# Patient Record
Sex: FEMALE | Race: BLACK OR AFRICAN AMERICAN | NOT HISPANIC OR LATINO | Employment: OTHER | URBAN - METROPOLITAN AREA
[De-identification: names, ages, dates, MRNs, and addresses within clinical notes are randomized per-mention and may not be internally consistent; named-entity substitution may affect disease eponyms.]

---

## 2022-12-13 ENCOUNTER — OFFICE VISIT (OUTPATIENT)
Dept: PODIATRY | Facility: CLINIC | Age: 82
End: 2022-12-13
Payer: COMMERCIAL

## 2022-12-13 VITALS — HEART RATE: 85 BPM | DIASTOLIC BLOOD PRESSURE: 86 MMHG | SYSTOLIC BLOOD PRESSURE: 176 MMHG

## 2022-12-13 DIAGNOSIS — I73.9 ASYMPTOMATIC PVD (PERIPHERAL VASCULAR DISEASE): Primary | ICD-10-CM

## 2022-12-13 DIAGNOSIS — B35.1 ONYCHOMYCOSIS DUE TO DERMATOPHYTE: ICD-10-CM

## 2022-12-13 DIAGNOSIS — G60.9 IDIOPATHIC PERIPHERAL NEUROPATHY: ICD-10-CM

## 2022-12-13 PROCEDURE — 99203 OFFICE O/P NEW LOW 30 MIN: CPT | Mod: 25,S$GLB,, | Performed by: PODIATRIST

## 2022-12-13 PROCEDURE — 99203 PR OFFICE/OUTPT VISIT, NEW, LEVL III, 30-44 MIN: ICD-10-PCS | Mod: 25,S$GLB,, | Performed by: PODIATRIST

## 2022-12-13 PROCEDURE — 11721 DEBRIDE NAIL 6 OR MORE: CPT | Mod: Q9,S$GLB,, | Performed by: PODIATRIST

## 2022-12-13 PROCEDURE — 11721 ROUTINE FOOT CARE: ICD-10-PCS | Mod: Q9,S$GLB,, | Performed by: PODIATRIST

## 2022-12-13 RX ORDER — OMEPRAZOLE 20 MG/1
20 CAPSULE, DELAYED RELEASE ORAL
COMMUNITY
Start: 2022-12-11

## 2022-12-13 RX ORDER — DULOXETIN HYDROCHLORIDE 30 MG/1
30 CAPSULE, DELAYED RELEASE ORAL
COMMUNITY
Start: 2022-11-16

## 2022-12-13 RX ORDER — HYALURONATE SODIUM 30 MG/2 ML
30 SYRINGE (ML) INTRAARTICULAR
COMMUNITY
Start: 2022-11-30 | End: 2023-07-11

## 2022-12-13 RX ORDER — LOSARTAN POTASSIUM 25 MG/1
25 TABLET ORAL
COMMUNITY
Start: 2022-08-03 | End: 2023-07-11

## 2022-12-13 RX ORDER — ROPINIROLE 0.25 MG/1
0.25 TABLET, FILM COATED ORAL NIGHTLY
COMMUNITY
Start: 2022-12-07 | End: 2023-07-11

## 2022-12-13 RX ORDER — FUROSEMIDE 40 MG/1
40 TABLET ORAL
COMMUNITY
Start: 2022-10-26

## 2022-12-13 RX ORDER — AMLODIPINE BESYLATE 10 MG/1
10 TABLET ORAL
COMMUNITY
Start: 2022-07-01 | End: 2023-07-11

## 2022-12-13 RX ORDER — LOSARTAN POTASSIUM 50 MG/1
50 TABLET ORAL
COMMUNITY
Start: 2022-10-26

## 2022-12-13 RX ORDER — POTASSIUM CHLORIDE 20 MEQ/1
20 TABLET, EXTENDED RELEASE ORAL 2 TIMES DAILY
COMMUNITY
Start: 2022-12-10

## 2022-12-13 RX ORDER — LOPERAMIDE HYDROCHLORIDE 2 MG/1
2 CAPSULE ORAL
COMMUNITY
Start: 2022-12-07 | End: 2023-07-11

## 2022-12-13 RX ORDER — TRAMADOL HYDROCHLORIDE 50 MG/1
50 TABLET ORAL 4 TIMES DAILY PRN
COMMUNITY
Start: 2022-12-07

## 2022-12-13 RX ORDER — CYCLOBENZAPRINE HCL 10 MG
10 TABLET ORAL 2 TIMES DAILY PRN
COMMUNITY
Start: 2022-11-22 | End: 2023-07-11

## 2022-12-13 RX ORDER — LATANOPROST 50 UG/ML
1 SOLUTION/ DROPS OPHTHALMIC NIGHTLY
COMMUNITY
Start: 2022-09-10 | End: 2023-07-11

## 2022-12-13 RX ORDER — ERGOCALCIFEROL 1.25 MG/1
50000 CAPSULE ORAL
COMMUNITY
Start: 2022-09-26 | End: 2023-07-11

## 2022-12-14 NOTE — PROGRESS NOTES
Subjective:      Patient ID: Valery Palmer is a 82 y.o. female.    Chief Complaint: Nail Care    Valery is a 82 y.o. female who presents to the clinic for evaluation and treatment of high risk feet. Valery has a past medical history of Cancer and Hypertension. The patient's chief complaint is long, thick toenails. This patient has documented high risk feet requiring routine maintenance secondary to peripheral vascular disease.    PCP: Denver Renteria MD    Date Last Seen by PCP: 12/07/2022    Current shoe gear:  Affected Foot: Tennis shoes     Unaffected Foot: Tennis shoes    Last encounter in this department: Visit date not found    No results found for: HGBA1C    Review of Systems   Constitutional: Negative for chills and fever.   Cardiovascular:  Positive for leg swelling. Negative for chest pain.   Respiratory:  Negative for cough and shortness of breath.    Gastrointestinal:  Negative for diarrhea, nausea and vomiting.   Neurological:  Positive for paresthesias.         Objective:      Physical Exam  Vitals reviewed.   Constitutional:       General: She is not in acute distress.     Appearance: Normal appearance. She is not ill-appearing.   HENT:      Nose: Nose normal.   Cardiovascular:      Rate and Rhythm: Normal rate.   Pulmonary:      Effort: Pulmonary effort is normal. No respiratory distress.   Skin:     Capillary Refill: Capillary refill takes 2 to 3 seconds.   Neurological:      Mental Status: She is alert and oriented to person, place, and time.   Psychiatric:         Mood and Affect: Mood normal.         Behavior: Behavior normal.         Thought Content: Thought content normal.         Judgment: Judgment normal.     Neurologic:  Protective and light touch sensation intact bilateral lower extremity, positive paresthesias reported distal aspect of the digits bilateral foot  Vascular: DP and PT pulses palpable 1/4 bilateral foot, capillary fill time less than 3 seconds to distal aspect of the  digits, pedal hair growth is absent bilateral lower extremity, skin temperature gradient warm to cool from proximal to distal bilateral foot, +1 nonpitting edema noted to the bilateral foot and ankle  Musculoskeletal:  5/5 muscle strength noted bilateral foot, ankle joint range of motion is reduced without pain, no masses noted bilateral foot, pain and tenderness with palpation of the bilateral 1st digit nail plate   Dermatologic:  Nails 1 through 5 bilateral are thickened, elongated, and discolored.  No open lesions noted bilateral foot, no interdigital maceration noted bilateral foot, mild hyperpigmentation noted to the distal 1/3 of the bilateral lower leg        Assessment:       Encounter Diagnoses   Name Primary?    Asymptomatic PVD (peripheral vascular disease) Yes    Onychomycosis due to dermatophyte     Idiopathic peripheral neuropathy          Plan:       Valery was seen today for nail care.    Diagnoses and all orders for this visit:    Asymptomatic PVD (peripheral vascular disease)  -     Routine Foot Care    Onychomycosis due to dermatophyte  -     Routine Foot Care    Idiopathic peripheral neuropathy  -     Routine Foot Care      I counseled the patient on her conditions, their implications and medical management.        1. Patient was examined and evaluated.    2. Discussed patient etiology of peripheral vascular disease.  Patient was advised to elevate lower extremity when at rest.  Patient will consider use of compression stockings for improvement of swelling.  Patient was advised to monitor diet for salt intake.    3. Discussed with patient fungal nail changes.  Discussed OTC topical fungal therapy such as tea tree oil and Vicks vapor rub   Routine Foot Care    Date/Time: 12/13/2022 2:00 PM  Performed by: Macario Chamberlain DPM  Authorized by: Macario Chamberlain DPM     Consent Done?:  Yes (Verbal)  Hyperkeratotic Skin Lesions?: No      Nail Care Type:  Debride  Location(s): All  (Left 1st Toe,  Left 3rd Toe, Left 2nd Toe, Left 4th Toe, Left 5th Toe, Right 1st Toe, Right 2nd Toe, Right 3rd Toe, Right 4th Toe and Right 5th Toe)  Patient tolerance:  Patient tolerated the procedure well with no immediate complications    4. Patient was advised to continue with comfortable shoe gear both inside and outside the home  5. Patient will continue with oral Cymbalta for improvement of occasional nerve complaints  6. Patient will follow-up in 3 months or p.r.n. for complaints

## 2023-04-06 ENCOUNTER — OFFICE VISIT (OUTPATIENT)
Dept: PODIATRY | Facility: CLINIC | Age: 83
End: 2023-04-06
Payer: COMMERCIAL

## 2023-04-06 VITALS
DIASTOLIC BLOOD PRESSURE: 75 MMHG | HEART RATE: 92 BPM | HEIGHT: 64 IN | WEIGHT: 150 LBS | SYSTOLIC BLOOD PRESSURE: 141 MMHG | BODY MASS INDEX: 25.61 KG/M2

## 2023-04-06 DIAGNOSIS — I73.9 ASYMPTOMATIC PVD (PERIPHERAL VASCULAR DISEASE): Primary | ICD-10-CM

## 2023-04-06 DIAGNOSIS — B35.1 ONYCHOMYCOSIS DUE TO DERMATOPHYTE: ICD-10-CM

## 2023-04-06 DIAGNOSIS — G60.9 IDIOPATHIC PERIPHERAL NEUROPATHY: ICD-10-CM

## 2023-04-06 PROCEDURE — 11721 ROUTINE FOOT CARE: ICD-10-PCS | Mod: Q9,S$GLB,, | Performed by: PODIATRIST

## 2023-04-06 PROCEDURE — 99213 OFFICE O/P EST LOW 20 MIN: CPT | Mod: 25,S$GLB,, | Performed by: PODIATRIST

## 2023-04-06 PROCEDURE — 11721 DEBRIDE NAIL 6 OR MORE: CPT | Mod: Q9,S$GLB,, | Performed by: PODIATRIST

## 2023-04-06 PROCEDURE — 99213 PR OFFICE/OUTPT VISIT, EST, LEVL III, 20-29 MIN: ICD-10-PCS | Mod: 25,S$GLB,, | Performed by: PODIATRIST

## 2023-04-06 RX ORDER — FUROSEMIDE 20 MG/1
20 TABLET ORAL
COMMUNITY
Start: 2023-03-28

## 2023-04-06 RX ORDER — HYDROCODONE BITARTRATE AND ACETAMINOPHEN 5; 325 MG/1; MG/1
1 TABLET ORAL EVERY 6 HOURS PRN
COMMUNITY
Start: 2023-04-02

## 2023-04-06 RX ORDER — LOSARTAN POTASSIUM 100 MG/1
100 TABLET ORAL
COMMUNITY
Start: 2023-03-28

## 2023-04-18 NOTE — PROGRESS NOTES
Subjective:     Patient ID: Valery Palmer is a 82 y.o. female.    Chief Complaint: Routine Foot Care    Valery is a 82 y.o. female who presents to the clinic for evaluation and treatment of high risk feet. Valery has a past medical history of Cancer and Hypertension. The patient's chief complaint is long, thick toenails. This patient has documented high risk feet requiring routine maintenance secondary to peripheral neuropathy.    PCP: Karma Valentine MD    Date Last Seen by PCP: 03/28/203    Current shoe gear:  Affected Foot: Tennis shoes     Unaffected Foot: Tennis shoes    Last encounter in this department: 12/13/2022    No results found for: HGBA1C    Review of Systems   Constitutional: Negative for chills and fever.   Cardiovascular:  Positive for leg swelling. Negative for chest pain.   Respiratory:  Negative for cough and shortness of breath.    Gastrointestinal:  Negative for diarrhea, nausea and vomiting.   Neurological:  Positive for numbness and paresthesias.      Objective:     Physical Exam  Vitals reviewed.   Constitutional:       General: She is not in acute distress.     Appearance: Normal appearance. She is not ill-appearing.   HENT:      Head: Normocephalic.      Nose: Nose normal.   Pulmonary:      Effort: Pulmonary effort is normal. No respiratory distress.   Skin:     Capillary Refill: Capillary refill takes 2 to 3 seconds.   Neurological:      Mental Status: She is alert and oriented to person, place, and time.   Psychiatric:         Mood and Affect: Mood normal.         Behavior: Behavior normal.         Thought Content: Thought content normal.         Judgment: Judgment normal.       Neurologic:  Protective and light touch sensation intact bilateral lower extremity, positive paresthesias reported distal aspect of the digits bilateral foot  Vascular: DP and PT pulses palpable 1/4 bilateral foot, capillary fill time less than 3 seconds to distal aspect of the digits, pedal hair growth is absent  bilateral lower extremity, skin temperature gradient warm to cool from proximal to distal bilateral foot, +1 nonpitting edema noted to the bilateral foot and ankle  Musculoskeletal:  5/5 muscle strength noted bilateral foot, ankle joint range of motion is reduced without pain, no masses noted bilateral foot, pain and tenderness with palpation of the bilateral 1st digit nail plate   Dermatologic:  Nails 1 through 5 bilateral are thickened, elongated, and discolored.  No open lesions noted bilateral foot, no interdigital maceration noted bilateral foot, mild hyperpigmentation noted to the distal 1/3 of the bilateral lower leg  Assessment:      Encounter Diagnoses   Name Primary?    Asymptomatic PVD (peripheral vascular disease) Yes    Onychomycosis due to dermatophyte     Idiopathic peripheral neuropathy      Plan:     Valery was seen today for routine foot care.    Diagnoses and all orders for this visit:    Asymptomatic PVD (peripheral vascular disease)  -     Routine Foot Care    Onychomycosis due to dermatophyte  -     Routine Foot Care    Idiopathic peripheral neuropathy  -     Routine Foot Care      I counseled the patient on her conditions, their implications and medical management.        1. Patient was examined and evaluated.    2. Discussed patient etiology of peripheral vascular disease.  Patient was advised to elevate lower extremity when at rest.  Patient will consider use of compression stockings for improvement of swelling.  Patient was advised to monitor diet for salt intake.    3. Discussed with patient fungal nail changes.  Discussed OTC topical fungal therapy such as tea tree oil and Vicks vapor rub   Routine Foot Care    Date/Time: 4/6/2023 1:15 PM  Performed by: Macario Chamberlain DPM  Authorized by: Macario Chamberlain DPM     Consent Done?:  Yes (Verbal)    Nail Care Type:  Debride  Location(s): All  (Left 1st Toe, Left 3rd Toe, Left 2nd Toe, Left 4th Toe, Left 5th Toe, Right 1st Toe, Right 2nd  Toe, Right 3rd Toe, Right 4th Toe and Right 5th Toe)  Patient tolerance:  Patient tolerated the procedure well with no immediate complications    4. Patient was advised to continue with comfortable shoe gear both inside and outside the home  5. Patient will continue with oral Cymbalta for improvement of occasional nerve complaints  6. Patient will follow-up in 3 months or p.r.n. for complaints

## 2023-07-11 ENCOUNTER — OFFICE VISIT (OUTPATIENT)
Dept: PODIATRY | Facility: CLINIC | Age: 83
End: 2023-07-11
Payer: COMMERCIAL

## 2023-07-11 VITALS
HEIGHT: 64 IN | DIASTOLIC BLOOD PRESSURE: 75 MMHG | HEART RATE: 90 BPM | WEIGHT: 150 LBS | SYSTOLIC BLOOD PRESSURE: 139 MMHG | BODY MASS INDEX: 25.61 KG/M2

## 2023-07-11 DIAGNOSIS — G60.9 IDIOPATHIC PERIPHERAL NEUROPATHY: ICD-10-CM

## 2023-07-11 DIAGNOSIS — B35.1 ONYCHOMYCOSIS DUE TO DERMATOPHYTE: ICD-10-CM

## 2023-07-11 DIAGNOSIS — I73.9 ASYMPTOMATIC PVD (PERIPHERAL VASCULAR DISEASE): Primary | ICD-10-CM

## 2023-07-11 PROCEDURE — 99499 NO LOS: ICD-10-PCS | Mod: S$GLB,,, | Performed by: PODIATRIST

## 2023-07-11 PROCEDURE — 11721 DEBRIDE NAIL 6 OR MORE: CPT | Mod: Q9,S$GLB,, | Performed by: PODIATRIST

## 2023-07-11 PROCEDURE — 99499 UNLISTED E&M SERVICE: CPT | Mod: S$GLB,,, | Performed by: PODIATRIST

## 2023-07-11 PROCEDURE — 11721 ROUTINE FOOT CARE: ICD-10-PCS | Mod: Q9,S$GLB,, | Performed by: PODIATRIST

## 2023-07-11 RX ORDER — OLMESARTAN MEDOXOMIL 40 MG/1
40 TABLET ORAL
COMMUNITY
Start: 2023-06-18

## 2023-07-15 NOTE — PROGRESS NOTES
Subjective:     Patient ID: Valery Palmer is a 83 y.o. female.    Chief Complaint: Nail Care    Valery is a 83 y.o. female who presents to the clinic for evaluation and treatment of high risk feet. Valery has a past medical history of Cancer and Hypertension. The patient's chief complaint is long, thick toenails. This patient has documented high risk feet requiring routine maintenance secondary to peripheral neuropathy.    PCP: Karma Valentine MD    Date Last Seen by PCP: 06/28/2023    Current shoe gear:  Affected Foot: Tennis shoes     Unaffected Foot: Tennis shoes    Last encounter in this department: 4/6/2023    No results found for: HGBA1C    Review of Systems   Constitutional: Negative for chills and fever.   Cardiovascular:  Positive for leg swelling. Negative for chest pain.   Respiratory:  Negative for cough and shortness of breath.    Gastrointestinal:  Negative for diarrhea, nausea and vomiting.   Neurological:  Positive for numbness and paresthesias.      Objective:     Physical Exam  Vitals reviewed.   Constitutional:       General: She is not in acute distress.     Appearance: Normal appearance. She is not ill-appearing.   HENT:      Head: Normocephalic.      Nose: Nose normal.   Pulmonary:      Effort: Pulmonary effort is normal. No respiratory distress.   Skin:     Capillary Refill: Capillary refill takes 2 to 3 seconds.   Neurological:      Mental Status: She is alert and oriented to person, place, and time.   Psychiatric:         Mood and Affect: Mood normal.         Behavior: Behavior normal.         Thought Content: Thought content normal.         Judgment: Judgment normal.     Neurologic:  Protective and light touch sensation intact bilateral lower extremity, positive paresthesias reported distal aspect of the digits bilateral foot  Vascular: DP and PT pulses palpable 1/4 bilateral foot, capillary fill time less than 3 seconds to distal aspect of the digits, pedal hair growth is absent bilateral  lower extremity, skin temperature gradient warm to cool from proximal to distal bilateral foot, +1 nonpitting edema noted to the bilateral foot and ankle  Musculoskeletal:  5/5 muscle strength noted bilateral foot, ankle joint range of motion is reduced without pain, no masses noted bilateral foot, pain and tenderness with palpation of the bilateral 1st digit nail plate   Dermatologic:  Nails 1 through 5 bilateral are thickened, elongated, and discolored.  No open lesions noted bilateral foot, no interdigital maceration noted bilateral foot, mild hyperpigmentation noted to the distal 1/3 of the bilateral lower leg    Assessment:      Encounter Diagnoses   Name Primary?    Asymptomatic PVD (peripheral vascular disease) Yes    Idiopathic peripheral neuropathy     Onychomycosis due to dermatophyte      Plan:     Valery was seen today for nail care.    Diagnoses and all orders for this visit:    Asymptomatic PVD (peripheral vascular disease)  -     Routine Foot Care    Idiopathic peripheral neuropathy  -     Routine Foot Care    Onychomycosis due to dermatophyte  -     Routine Foot Care      I counseled the patient on her conditions, their implications and medical management.        1. Patient was examined and evaluated.    2. Discussed patient etiology of peripheral vascular disease.  Patient was advised to elevate lower extremity when at rest.  Patient will consider use of compression stockings for improvement of swelling.  Patient was advised to monitor diet for salt intake.    Routine Foot Care    Date/Time: 7/11/2023 10:45 AM  Performed by: Macario Chamberlain DPM  Authorized by: Macario Chamberlain DPM     Consent Done?:  Yes (Verbal)    Nail Care Type:  Debride  Location(s): All  (Left 1st Toe, Left 3rd Toe, Left 2nd Toe, Left 4th Toe, Left 5th Toe, Right 1st Toe, Right 2nd Toe, Right 3rd Toe, Right 4th Toe and Right 5th Toe)  Patient tolerance:  Patient tolerated the procedure well with no immediate  complications    3. Discussed with patient fungal nail changes.  Discussed OTC topical fungal therapy such as tea tree oil and Vicks vapor rub   4. Patient was advised to continue with comfortable shoe gear both inside and outside the home  5. Patient will continue with oral Cymbalta for improvement of occasional nerve complaints  6. Patient will follow-up in 3 months or p.r.n. for complaints

## 2023-10-20 ENCOUNTER — OFFICE VISIT (OUTPATIENT)
Dept: PODIATRY | Facility: CLINIC | Age: 83
End: 2023-10-20
Payer: COMMERCIAL

## 2023-10-20 VITALS
HEIGHT: 64 IN | WEIGHT: 150 LBS | SYSTOLIC BLOOD PRESSURE: 143 MMHG | DIASTOLIC BLOOD PRESSURE: 80 MMHG | HEART RATE: 91 BPM | BODY MASS INDEX: 25.61 KG/M2

## 2023-10-20 DIAGNOSIS — I73.9 ASYMPTOMATIC PVD (PERIPHERAL VASCULAR DISEASE): Primary | ICD-10-CM

## 2023-10-20 DIAGNOSIS — G60.9 IDIOPATHIC PERIPHERAL NEUROPATHY: ICD-10-CM

## 2023-10-20 DIAGNOSIS — B35.1 ONYCHOMYCOSIS DUE TO DERMATOPHYTE: ICD-10-CM

## 2023-10-20 PROCEDURE — 99499 NO LOS: ICD-10-PCS | Mod: S$GLB,,, | Performed by: PODIATRIST

## 2023-10-20 PROCEDURE — 11721 ROUTINE FOOT CARE: ICD-10-PCS | Mod: Q9,S$GLB,, | Performed by: PODIATRIST

## 2023-10-20 PROCEDURE — 11721 DEBRIDE NAIL 6 OR MORE: CPT | Mod: Q9,S$GLB,, | Performed by: PODIATRIST

## 2023-10-20 PROCEDURE — 99499 UNLISTED E&M SERVICE: CPT | Mod: S$GLB,,, | Performed by: PODIATRIST

## 2023-11-14 NOTE — PROGRESS NOTES
"Subjective:     Patient ID: Valery Palmer is a 83 y.o. female.    Chief Complaint: Foot Pain and Nail Care    Valery is a 83 y.o. female who presents to the clinic for evaluation and treatment of high risk feet. Valery has a past medical history of Cancer and Hypertension. The patient's chief complaint is long, thick toenails. This patient has documented high risk feet requiring routine maintenance secondary to peripheral vascular disease.    PCP: Karma Valentine MD    Date Last Seen by PCP: 10/11/2023    Current shoe gear:  Affected Foot: Casual shoes     Unaffected Foot: Casual shoes    Last encounter in this department: 7/11/2023    No results found for: "HGBA1C"    Review of Systems   Constitutional: Negative for chills and fever.   Cardiovascular:  Positive for leg swelling. Negative for chest pain.   Respiratory:  Negative for cough and shortness of breath.    Gastrointestinal:  Negative for diarrhea, nausea and vomiting.   Neurological:  Positive for paresthesias.        Objective:     Physical Exam  Vitals reviewed.   Constitutional:       General: She is not in acute distress.     Appearance: Normal appearance. She is not ill-appearing.   HENT:      Head: Normocephalic.      Nose: Nose normal.   Pulmonary:      Effort: Pulmonary effort is normal. No respiratory distress.   Skin:     Capillary Refill: Capillary refill takes 2 to 3 seconds.   Neurological:      Mental Status: She is alert and oriented to person, place, and time.   Psychiatric:         Mood and Affect: Mood normal.         Behavior: Behavior normal.         Thought Content: Thought content normal.         Judgment: Judgment normal.         Neurologic:  Protective and light touch sensation intact bilateral lower extremity, positive paresthesias reported distal aspect of the digits bilateral foot  Vascular: DP and PT pulses palpable 1/4 bilateral foot, capillary fill time less than 3 seconds to distal aspect of the digits, pedal hair growth is " "absent bilateral lower extremity, skin temperature gradient warm to cool from proximal to distal bilateral foot, +1 nonpitting edema noted to the bilateral foot and ankle  Musculoskeletal:  5/5 muscle strength noted bilateral foot, ankle joint range of motion is reduced without pain, no masses noted bilateral foot, pain and tenderness with palpation of the bilateral 1st digit nail plate   Dermatologic:  Nails 1 through 5 bilateral are thickened, elongated, and discolored.  No open lesions noted bilateral foot, no interdigital maceration noted bilateral foot, mild hyperpigmentation noted to the distal 1/3 of the bilateral lower leg     Assessment:      Encounter Diagnoses   Name Primary?    Asymptomatic PVD (peripheral vascular disease) Yes    Idiopathic peripheral neuropathy     Onychomycosis due to dermatophyte      Plan:     Valery was seen today for foot pain and nail care.    Diagnoses and all orders for this visit:    Asymptomatic PVD (peripheral vascular disease)  -     Routine Foot Care    Idiopathic peripheral neuropathy  -     Routine Foot Care    Onychomycosis due to dermatophyte  -     Routine Foot Care      I counseled the patient on her conditions, their implications and medical management.          1. Patient was examined and evaluated.    2. Discussed patient etiology of peripheral vascular disease.  Patient was advised to elevate lower extremity when at rest.  Patient will consider use of compression stockings for improvement of swelling.  Patient was advised to monitor diet for salt intake.     3. Discussed with patient fungal nail changes.  Discussed OTC topical fungal therapy such as tea tree oil and Vicks vapor rub   Routine Foot Care    Date/Time: 10/20/2023 1:15 PM    Performed by: Macario Chamberlain DPM  Authorized by: Macario Chamberlain DPM    Time out: Immediately prior to procedure a "time out" was called to verify the correct patient, procedure, equipment, support staff and site/side " marked as required.      Nail Care Type:  Debride  Location(s): All  (Left 1st Toe, Left 3rd Toe, Left 2nd Toe, Left 4th Toe, Left 5th Toe, Right 1st Toe, Right 2nd Toe, Right 3rd Toe, Right 4th Toe and Right 5th Toe)  Patient tolerance:  Patient tolerated the procedure well with no immediate complications      4. Patient was advised to continue with comfortable shoe gear both inside and outside the home  5. Patient will continue with oral Cymbalta for improvement of occasional nerve complaints  6. Patient will follow-up in 3 months or p.r.n. for complaints